# Patient Record
Sex: MALE | Race: WHITE
[De-identification: names, ages, dates, MRNs, and addresses within clinical notes are randomized per-mention and may not be internally consistent; named-entity substitution may affect disease eponyms.]

---

## 2021-12-20 ENCOUNTER — HOSPITAL ENCOUNTER (INPATIENT)
Dept: HOSPITAL 12 - ER | Age: 71
LOS: 2 days | Discharge: HOME | DRG: 640 | End: 2021-12-22
Payer: COMMERCIAL

## 2021-12-20 VITALS — WEIGHT: 162 LBS | HEIGHT: 65 IN | BODY MASS INDEX: 26.99 KG/M2

## 2021-12-20 VITALS — DIASTOLIC BLOOD PRESSURE: 57 MMHG | SYSTOLIC BLOOD PRESSURE: 105 MMHG

## 2021-12-20 DIAGNOSIS — R74.01: ICD-10-CM

## 2021-12-20 DIAGNOSIS — E87.2: ICD-10-CM

## 2021-12-20 DIAGNOSIS — I42.6: ICD-10-CM

## 2021-12-20 DIAGNOSIS — I50.22: ICD-10-CM
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
## 2021-12-20 DIAGNOSIS — E86.0: Primary | ICD-10-CM

## 2021-12-20 DIAGNOSIS — Z95.810: ICD-10-CM

## 2021-12-20 DIAGNOSIS — K70.30: ICD-10-CM

## 2021-12-20 DIAGNOSIS — Z79.84: ICD-10-CM

## 2021-12-20 DIAGNOSIS — N17.0: ICD-10-CM

## 2021-12-20 DIAGNOSIS — Z20.822: ICD-10-CM

## 2021-12-20 DIAGNOSIS — I48.91: ICD-10-CM

## 2021-12-20 DIAGNOSIS — I25.10: ICD-10-CM

## 2021-12-20 DIAGNOSIS — D68.69: ICD-10-CM

## 2021-12-20 DIAGNOSIS — Z95.5: ICD-10-CM

## 2021-12-20 DIAGNOSIS — I11.0: ICD-10-CM

## 2021-12-20 DIAGNOSIS — E83.42: ICD-10-CM

## 2021-12-20 DIAGNOSIS — F10.20: ICD-10-CM

## 2021-12-20 DIAGNOSIS — E11.9: ICD-10-CM

## 2021-12-20 DIAGNOSIS — Z87.891: ICD-10-CM

## 2021-12-20 DIAGNOSIS — Z79.01: ICD-10-CM

## 2021-12-20 DIAGNOSIS — I95.89: ICD-10-CM

## 2021-12-20 DIAGNOSIS — T50.2X5A: ICD-10-CM

## 2021-12-20 DIAGNOSIS — I35.8: ICD-10-CM

## 2021-12-20 LAB
ALP SERPL-CCNC: 124 U/L (ref 50–136)
ALT SERPL W/O P-5'-P-CCNC: 180 U/L (ref 16–63)
APPEARANCE UR: CLEAR
AST SERPL-CCNC: 265 U/L (ref 15–37)
BILIRUB DIRECT SERPL-MCNC: 0.7 MG/DL (ref 0–0.2)
BILIRUB SERPL-MCNC: 2 MG/DL (ref 0.2–1)
BILIRUB UR QL STRIP: NEGATIVE
BUN SERPL-MCNC: 32 MG/DL (ref 7–18)
CHLORIDE SERPL-SCNC: 101 MMOL/L (ref 98–107)
CO2 SERPL-SCNC: 26 MMOL/L (ref 21–32)
COLOR UR: YELLOW
CREAT SERPL-MCNC: 2 MG/DL (ref 0.6–1.3)
DEPRECATED SQUAMOUS URNS QL MICRO: (no result) /HPF
GLUCOSE SERPL-MCNC: 127 MG/DL (ref 74–106)
GLUCOSE UR STRIP-MCNC: NEGATIVE MG/DL
HCT VFR BLD AUTO: 38.7 % (ref 36.7–47.1)
HGB UR QL STRIP: NEGATIVE
KETONES UR STRIP-MCNC: NEGATIVE MG/DL
LEUKOCYTE ESTERASE UR QL STRIP: NEGATIVE
MCH RBC QN AUTO: 34.6 UUG (ref 23.8–33.4)
MCV RBC AUTO: 101.3 FL (ref 73–96.2)
NITRITE UR QL STRIP: NEGATIVE
PH UR STRIP: 6.5 [PH] (ref 5–8)
PLATELET # BLD AUTO: 148 K/UL (ref 152–348)
POTASSIUM SERPL-SCNC: 3.6 MMOL/L (ref 3.5–5.1)
RBC #/AREA URNS HPF: (no result) /HPF (ref 0–3)
SP GR UR STRIP: 1.02 (ref 1–1.03)
UROBILINOGEN UR STRIP-MCNC: 1 E.U./DL
WBC #/AREA URNS HPF: (no result) /HPF
WBC #/AREA URNS HPF: (no result) /HPF (ref 0–3)
WS STN SPEC: 6.5 G/DL (ref 6.4–8.2)

## 2021-12-20 PROCEDURE — A4663 DIALYSIS BLOOD PRESSURE CUFF: HCPCS

## 2021-12-20 PROCEDURE — G0378 HOSPITAL OBSERVATION PER HR: HCPCS

## 2021-12-20 RX ADMIN — Medication SCH EACH: at 16:05

## 2021-12-20 RX ADMIN — METFORMIN HYDROCHLORIDE SCH MG: 500 TABLET ORAL at 16:18

## 2021-12-20 RX ADMIN — SODIUM CHLORIDE PRN MLS/HR: 0.9 INJECTION, SOLUTION INTRAVENOUS at 16:10

## 2021-12-20 RX ADMIN — Medication SCH EACH: at 21:14

## 2021-12-20 RX ADMIN — Medication SCH EACH: at 13:15

## 2021-12-20 NOTE — NUR
Pt ambulated to bathroom.  Pt needs min assistance secondary to pt has poor balance and 
weakness.  Pt is in no acute distress. V pacing on tele.  Skin intact.  Call light is within 
reach.

## 2021-12-20 NOTE — NUR
Pt continues to be A&Ox4, responsive and speaking in clear/complete sentences. 
Daughter-in-law at bedside.

## 2021-12-20 NOTE — NUR
EKG done, labs drawn, IV fluids started. Pt responsive, A&Ox4, with no 
complaints of pain at this time. Aware he is waiting for results.

## 2021-12-20 NOTE — NUR
ADMITTED FROM [facility]CTORS OFFICE VIA ER. A 70 YO M WITH AN ADMITTING DIAGNOSIS OF HYPERTENSION. 
ALERT AND ORIENTED X4. ASSISTED TO BED WITH HAND HOLD ASSIST. NOTED UNSTEADY GAIT. DENIES 
DIZZINESS. NO N/V UPON ARRIVAL ON THE FLOOR. PACING ON MONITOR. ROUTINE ADMISSION ASSESSMENT 
INITIATED. MD IS NOTIFIED ABOUT ADMISSION TO FLOOR.

## 2021-12-21 VITALS — DIASTOLIC BLOOD PRESSURE: 56 MMHG | SYSTOLIC BLOOD PRESSURE: 97 MMHG

## 2021-12-21 VITALS — DIASTOLIC BLOOD PRESSURE: 56 MMHG | SYSTOLIC BLOOD PRESSURE: 111 MMHG

## 2021-12-21 VITALS — DIASTOLIC BLOOD PRESSURE: 49 MMHG | SYSTOLIC BLOOD PRESSURE: 90 MMHG

## 2021-12-21 VITALS — SYSTOLIC BLOOD PRESSURE: 91 MMHG | DIASTOLIC BLOOD PRESSURE: 44 MMHG

## 2021-12-21 VITALS — SYSTOLIC BLOOD PRESSURE: 90 MMHG | DIASTOLIC BLOOD PRESSURE: 54 MMHG

## 2021-12-21 VITALS — DIASTOLIC BLOOD PRESSURE: 50 MMHG | SYSTOLIC BLOOD PRESSURE: 95 MMHG

## 2021-12-21 LAB
ALP SERPL-CCNC: 109 U/L (ref 50–136)
ALT SERPL W/O P-5'-P-CCNC: 151 U/L (ref 16–63)
AST SERPL-CCNC: 215 U/L (ref 15–37)
BILIRUB SERPL-MCNC: 1.4 MG/DL (ref 0.2–1)
BUN SERPL-MCNC: 27 MG/DL (ref 7–18)
CHLORIDE SERPL-SCNC: 105 MMOL/L (ref 98–107)
CO2 SERPL-SCNC: 28 MMOL/L (ref 21–32)
CREAT SERPL-MCNC: 1.5 MG/DL (ref 0.6–1.3)
GLUCOSE SERPL-MCNC: 78 MG/DL (ref 74–106)
HCT VFR BLD AUTO: 32.8 % (ref 36.7–47.1)
MAGNESIUM SERPL-MCNC: 1.7 MG/DL (ref 1.8–2.4)
MCH RBC QN AUTO: 34.8 UUG (ref 23.8–33.4)
MCV RBC AUTO: 100.2 FL (ref 73–96.2)
PHOSPHATE SERPL-MCNC: 3.4 MG/DL (ref 2.5–4.9)
PLATELET # BLD AUTO: 135 K/UL (ref 152–348)
POTASSIUM SERPL-SCNC: 3.7 MMOL/L (ref 3.5–5.1)
WS STN SPEC: 5.2 G/DL (ref 6.4–8.2)

## 2021-12-21 RX ADMIN — METFORMIN HYDROCHLORIDE SCH MG: 500 TABLET ORAL at 09:31

## 2021-12-21 RX ADMIN — Medication SCH EACH: at 16:40

## 2021-12-21 RX ADMIN — MAGNESIUM SULFATE IN DEXTROSE SCH MLS/HR: 10 INJECTION, SOLUTION INTRAVENOUS at 09:30

## 2021-12-21 RX ADMIN — METFORMIN HYDROCHLORIDE SCH MG: 500 TABLET ORAL at 16:36

## 2021-12-21 RX ADMIN — AMIODARONE HYDROCHLORIDE SCH MG: 200 TABLET ORAL at 09:30

## 2021-12-21 RX ADMIN — CLOPIDOGREL BISULFATE SCH MG: 75 TABLET ORAL at 09:30

## 2021-12-21 RX ADMIN — MAGNESIUM SULFATE IN DEXTROSE SCH MLS/HR: 10 INJECTION, SOLUTION INTRAVENOUS at 10:43

## 2021-12-21 RX ADMIN — Medication SCH EACH: at 11:29

## 2021-12-21 RX ADMIN — Medication SCH EACH: at 21:07

## 2021-12-21 RX ADMIN — FOLIC ACID SCH MG: 1 TABLET ORAL at 09:31

## 2021-12-21 RX ADMIN — Medication SCH EACH: at 06:43

## 2021-12-21 NOTE — NUR
PT slept  throughout night.  VSS is in no acute distress.  call light is within reach.  IVF 
infusing as ordered.

## 2021-12-22 VITALS — DIASTOLIC BLOOD PRESSURE: 43 MMHG | SYSTOLIC BLOOD PRESSURE: 80 MMHG

## 2021-12-22 VITALS — SYSTOLIC BLOOD PRESSURE: 110 MMHG | DIASTOLIC BLOOD PRESSURE: 64 MMHG

## 2021-12-22 VITALS — SYSTOLIC BLOOD PRESSURE: 113 MMHG | DIASTOLIC BLOOD PRESSURE: 66 MMHG

## 2021-12-22 VITALS — SYSTOLIC BLOOD PRESSURE: 105 MMHG | DIASTOLIC BLOOD PRESSURE: 56 MMHG

## 2021-12-22 VITALS — SYSTOLIC BLOOD PRESSURE: 113 MMHG | DIASTOLIC BLOOD PRESSURE: 75 MMHG

## 2021-12-22 VITALS — DIASTOLIC BLOOD PRESSURE: 50 MMHG | SYSTOLIC BLOOD PRESSURE: 89 MMHG

## 2021-12-22 LAB
BUN SERPL-MCNC: 16 MG/DL (ref 7–18)
CHLORIDE SERPL-SCNC: 107 MMOL/L (ref 98–107)
CO2 SERPL-SCNC: 27 MMOL/L (ref 21–32)
CREAT SERPL-MCNC: 1 MG/DL (ref 0.6–1.3)
GLUCOSE SERPL-MCNC: 74 MG/DL (ref 74–106)
MAGNESIUM SERPL-MCNC: 1.9 MG/DL (ref 1.8–2.4)
POTASSIUM SERPL-SCNC: 4.1 MMOL/L (ref 3.5–5.1)

## 2021-12-22 RX ADMIN — Medication SCH EACH: at 16:36

## 2021-12-22 RX ADMIN — FOLIC ACID SCH MG: 1 TABLET ORAL at 08:45

## 2021-12-22 RX ADMIN — AMIODARONE HYDROCHLORIDE SCH MG: 200 TABLET ORAL at 08:45

## 2021-12-22 RX ADMIN — Medication SCH EACH: at 06:26

## 2021-12-22 RX ADMIN — METFORMIN HYDROCHLORIDE SCH MG: 500 TABLET ORAL at 08:45

## 2021-12-22 RX ADMIN — METFORMIN HYDROCHLORIDE SCH MG: 500 TABLET ORAL at 16:12

## 2021-12-22 RX ADMIN — CLOPIDOGREL BISULFATE SCH MG: 75 TABLET ORAL at 08:45

## 2021-12-22 RX ADMIN — Medication SCH EACH: at 11:30

## 2021-12-22 RX ADMIN — SODIUM CHLORIDE PRN MLS/HR: 0.9 INJECTION, SOLUTION INTRAVENOUS at 02:44

## 2021-12-22 NOTE — NUR
d/c to home with son and daughter in law. d/c instructions given regarding picking up 
prescriptions and taking b/p meds as ordered and taking b/p daily to ensure b/p is wnl prior 
to giving b/p meds. son and daughter verbalized understanding.